# Patient Record
Sex: FEMALE | Race: WHITE | NOT HISPANIC OR LATINO | Employment: PART TIME | URBAN - METROPOLITAN AREA
[De-identification: names, ages, dates, MRNs, and addresses within clinical notes are randomized per-mention and may not be internally consistent; named-entity substitution may affect disease eponyms.]

---

## 2023-05-15 PROBLEM — L70.9 ACNE: Status: ACTIVE | Noted: 2023-05-15

## 2023-05-15 RX ORDER — SPIRONOLACTONE 50 MG/1
1 TABLET, FILM COATED ORAL 2 TIMES DAILY
COMMUNITY
Start: 2020-12-14 | End: 2024-05-21 | Stop reason: SDUPTHER

## 2023-05-15 RX ORDER — NORGESTIMATE AND ETHINYL ESTRADIOL 0.25-0.035
1 KIT ORAL DAILY
COMMUNITY
Start: 2023-01-09 | End: 2023-12-12

## 2023-05-17 ENCOUNTER — OFFICE VISIT (OUTPATIENT)
Dept: PRIMARY CARE | Facility: CLINIC | Age: 23
End: 2023-05-17
Payer: COMMERCIAL

## 2023-05-17 VITALS
HEIGHT: 64 IN | WEIGHT: 191 LBS | HEART RATE: 110 BPM | DIASTOLIC BLOOD PRESSURE: 90 MMHG | BODY MASS INDEX: 32.61 KG/M2 | SYSTOLIC BLOOD PRESSURE: 124 MMHG

## 2023-05-17 DIAGNOSIS — Z76.89 ENCOUNTER TO ESTABLISH CARE: ICD-10-CM

## 2023-05-17 DIAGNOSIS — F41.9 ANXIETY: ICD-10-CM

## 2023-05-17 DIAGNOSIS — Z00.00 HEALTHCARE MAINTENANCE: ICD-10-CM

## 2023-05-17 DIAGNOSIS — L70.0 ACNE VULGARIS: Primary | ICD-10-CM

## 2023-05-17 PROCEDURE — 99385 PREV VISIT NEW AGE 18-39: CPT | Performed by: CLINICAL NURSE SPECIALIST

## 2023-05-17 PROCEDURE — 1036F TOBACCO NON-USER: CPT | Performed by: CLINICAL NURSE SPECIALIST

## 2023-05-17 ASSESSMENT — ENCOUNTER SYMPTOMS
SLEEP DISTURBANCE: 0
HEMATURIA: 0
CHILLS: 0
ABDOMINAL PAIN: 0
EYE PAIN: 0
NECK PAIN: 0
OCCASIONAL FEELINGS OF UNSTEADINESS: 0
CONSTIPATION: 0
LOSS OF SENSATION IN FEET: 0
PHOTOPHOBIA: 0
WHEEZING: 0
UNEXPECTED WEIGHT CHANGE: 0
DYSURIA: 0
FEVER: 0
POLYDIPSIA: 0
WOUND: 0
DIZZINESS: 0
VOMITING: 0
CONFUSION: 0
DIARRHEA: 0
BRUISES/BLEEDS EASILY: 0
FLANK PAIN: 0
MYALGIAS: 0
DEPRESSION: 0
ACTIVITY CHANGE: 0
APPETITE CHANGE: 0
HEADACHES: 0
SEIZURES: 0
ARTHRALGIAS: 0
COUGH: 0
FATIGUE: 0
BLOOD IN STOOL: 0
CHEST TIGHTNESS: 0
PALPITATIONS: 0
JOINT SWELLING: 0
SORE THROAT: 0
NAUSEA: 0
TROUBLE SWALLOWING: 0
SHORTNESS OF BREATH: 0
BACK PAIN: 0

## 2023-05-17 ASSESSMENT — ANXIETY QUESTIONNAIRES
7. FEELING AFRAID AS IF SOMETHING AWFUL MIGHT HAPPEN: SEVERAL DAYS
IF YOU CHECKED OFF ANY PROBLEMS ON THIS QUESTIONNAIRE, HOW DIFFICULT HAVE THESE PROBLEMS MADE IT FOR YOU TO DO YOUR WORK, TAKE CARE OF THINGS AT HOME, OR GET ALONG WITH OTHER PEOPLE: SOMEWHAT DIFFICULT
4. TROUBLE RELAXING: SEVERAL DAYS
2. NOT BEING ABLE TO STOP OR CONTROL WORRYING: MORE THAN HALF THE DAYS
GAD7 TOTAL SCORE: 12
5. BEING SO RESTLESS THAT IT IS HARD TO SIT STILL: NEARLY EVERY DAY
3. WORRYING TOO MUCH ABOUT DIFFERENT THINGS: MORE THAN HALF THE DAYS
6. BECOMING EASILY ANNOYED OR IRRITABLE: SEVERAL DAYS
1. FEELING NERVOUS, ANXIOUS, OR ON EDGE: MORE THAN HALF THE DAYS

## 2023-05-17 ASSESSMENT — COLUMBIA-SUICIDE SEVERITY RATING SCALE - C-SSRS
1. IN THE PAST MONTH, HAVE YOU WISHED YOU WERE DEAD OR WISHED YOU COULD GO TO SLEEP AND NOT WAKE UP?: NO
2. HAVE YOU ACTUALLY HAD ANY THOUGHTS OF KILLING YOURSELF?: NO
6. HAVE YOU EVER DONE ANYTHING, STARTED TO DO ANYTHING, OR PREPARED TO DO ANYTHING TO END YOUR LIFE?: NO

## 2023-05-17 ASSESSMENT — PATIENT HEALTH QUESTIONNAIRE - PHQ9
2. FEELING DOWN, DEPRESSED OR HOPELESS: NOT AT ALL
SUM OF ALL RESPONSES TO PHQ9 QUESTIONS 1 AND 2: 0
1. LITTLE INTEREST OR PLEASURE IN DOING THINGS: NOT AT ALL

## 2023-05-17 NOTE — PROGRESS NOTES
Subjective   Patient ID: Charu Head is a 22 y.o. female who presents for Establish Care (To est new patient Discuss anxiety ).  HPI    New patient here today to establish care. Has been having increased concerns for Anxiety. Patient states that she started taking Ashwaghanda. In April noticed that her Anxiety was worse daily. Took medication for about 2-3 weeks. Thought she was feeling better and stopped medication. Tearful. Increased stressors. Graduating. Unsure what is causing her Anxiety to feel worse. Not interested in Counseling. Feels that she has a support system with her Boyfriend and Mother.     Review of Systems   Constitutional:  Negative for activity change, appetite change, chills, fatigue, fever and unexpected weight change.   HENT:  Negative for ear pain, hearing loss, nosebleeds, sore throat, tinnitus and trouble swallowing.    Eyes:  Negative for photophobia, pain and visual disturbance.   Respiratory:  Negative for cough, chest tightness, shortness of breath and wheezing.    Cardiovascular:  Negative for chest pain, palpitations and leg swelling.   Gastrointestinal:  Negative for abdominal pain, blood in stool, constipation, diarrhea, nausea and vomiting.   Endocrine: Negative for cold intolerance, heat intolerance, polydipsia and polyuria.   Genitourinary:  Negative for dysuria, flank pain and hematuria.   Musculoskeletal:  Negative for arthralgias, back pain, joint swelling, myalgias and neck pain.   Skin:  Negative for pallor, rash and wound.   Allergic/Immunologic: Negative for immunocompromised state.   Neurological:  Negative for dizziness, seizures and headaches.   Hematological:  Does not bruise/bleed easily.   Psychiatric/Behavioral:  Negative for confusion and sleep disturbance.        Objective   Physical Exam  Vitals and nursing note reviewed.   Constitutional:       General: She is not in acute distress.     Appearance: Normal appearance.   HENT:      Head: Normocephalic.       Nose: Nose normal.   Eyes:      Conjunctiva/sclera: Conjunctivae normal.   Neck:      Vascular: No carotid bruit.   Cardiovascular:      Rate and Rhythm: Normal rate and regular rhythm.      Pulses: Normal pulses.      Heart sounds: Normal heart sounds.   Pulmonary:      Effort: Pulmonary effort is normal.      Breath sounds: Normal breath sounds.   Abdominal:      General: Bowel sounds are normal.      Palpations: Abdomen is soft.   Musculoskeletal:         General: Normal range of motion.      Cervical back: Normal range of motion.   Skin:     General: Skin is warm and dry.   Neurological:      Mental Status: She is alert and oriented to person, place, and time. Mental status is at baseline.   Psychiatric:         Mood and Affect: Mood normal.         Behavior: Behavior normal.         Assessment/Plan       New order for blood work provided at OV today.      Anxiety: GAD7 completed. Discussed management options. Declined Counseling. Would like to continue to take Ashwagandha. Follow up in 1 month.   Acne: Continue Spironolactone. Updated lab work ordered.   Wellness: Routine and age appropriate recommendations discussed with the patient today and patient verbalized understanding of the recommendations.  Questions answered.  Age appropriate immunizations and preventative screenings discussed with the patient and ordered as appropriate. Labs updated and ordered as indicated. Recommend healthy diet and daily exercise to maintain healthy body weight.

## 2023-05-17 NOTE — LETTER
May 17, 2023     Patient: Charu Head   YOB: 2000   Date of Visit: 5/17/2023       To Whom It May Concern:    Charu Head was seen in my clinic on 5/17/2023 at 2:40 pm. Please excuse Emili from work 5/17/2023    If you have any questions or concerns, please don't hesitate to call.         Sincerely,         Kiara Mendenhall, APRN-CNS

## 2023-05-20 ENCOUNTER — LAB (OUTPATIENT)
Dept: LAB | Facility: LAB | Age: 23
End: 2023-05-20
Payer: COMMERCIAL

## 2023-05-20 DIAGNOSIS — Z00.00 HEALTHCARE MAINTENANCE: ICD-10-CM

## 2023-05-20 DIAGNOSIS — L70.0 ACNE VULGARIS: ICD-10-CM

## 2023-05-20 DIAGNOSIS — F41.9 ANXIETY: ICD-10-CM

## 2023-05-20 LAB
ALANINE AMINOTRANSFERASE (SGPT) (U/L) IN SER/PLAS: 16 U/L (ref 7–45)
ALBUMIN (G/DL) IN SER/PLAS: 4.2 G/DL (ref 3.4–5)
ALKALINE PHOSPHATASE (U/L) IN SER/PLAS: 72 U/L (ref 33–110)
ANION GAP IN SER/PLAS: 12 MMOL/L (ref 10–20)
ASPARTATE AMINOTRANSFERASE (SGOT) (U/L) IN SER/PLAS: 13 U/L (ref 9–39)
BILIRUBIN TOTAL (MG/DL) IN SER/PLAS: 0.7 MG/DL (ref 0–1.2)
CALCIDIOL (25 OH VITAMIN D3) (NG/ML) IN SER/PLAS: 47 NG/ML
CALCIUM (MG/DL) IN SER/PLAS: 9 MG/DL (ref 8.6–10.3)
CARBON DIOXIDE, TOTAL (MMOL/L) IN SER/PLAS: 27 MMOL/L (ref 21–32)
CHLORIDE (MMOL/L) IN SER/PLAS: 103 MMOL/L (ref 98–107)
CHOLESTEROL (MG/DL) IN SER/PLAS: 201 MG/DL (ref 0–199)
CHOLESTEROL IN HDL (MG/DL) IN SER/PLAS: 44.7 MG/DL
CHOLESTEROL/HDL RATIO: 4.5
COBALAMIN (VITAMIN B12) (PG/ML) IN SER/PLAS: 195 PG/ML (ref 211–911)
CREATININE (MG/DL) IN SER/PLAS: 0.75 MG/DL (ref 0.5–1.05)
ERYTHROCYTE DISTRIBUTION WIDTH (RATIO) BY AUTOMATED COUNT: 11.8 % (ref 11.5–14.5)
ERYTHROCYTE MEAN CORPUSCULAR HEMOGLOBIN CONCENTRATION (G/DL) BY AUTOMATED: 33.7 G/DL (ref 32–36)
ERYTHROCYTE MEAN CORPUSCULAR VOLUME (FL) BY AUTOMATED COUNT: 87 FL (ref 80–100)
ERYTHROCYTES (10*6/UL) IN BLOOD BY AUTOMATED COUNT: 4.81 X10E12/L (ref 4–5.2)
GFR FEMALE: >90 ML/MIN/1.73M2
GLUCOSE (MG/DL) IN SER/PLAS: 79 MG/DL (ref 74–99)
HEMATOCRIT (%) IN BLOOD BY AUTOMATED COUNT: 41.8 % (ref 36–46)
HEMOGLOBIN (G/DL) IN BLOOD: 14.1 G/DL (ref 12–16)
LDL: 122 MG/DL (ref 0–119)
LEUKOCYTES (10*3/UL) IN BLOOD BY AUTOMATED COUNT: 7.8 X10E9/L (ref 4.4–11.3)
NON HDL CHOLESTEROL: 156 MG/DL (ref 0–149)
PLATELETS (10*3/UL) IN BLOOD AUTOMATED COUNT: 349 X10E9/L (ref 150–450)
POTASSIUM (MMOL/L) IN SER/PLAS: 4.1 MMOL/L (ref 3.5–5.3)
PROTEIN TOTAL: 6.8 G/DL (ref 6.4–8.2)
SODIUM (MMOL/L) IN SER/PLAS: 138 MMOL/L (ref 136–145)
THYROTROPIN (MIU/L) IN SER/PLAS BY DETECTION LIMIT <= 0.05 MIU/L: 2.08 MIU/L (ref 0.44–3.98)
TRIGLYCERIDE (MG/DL) IN SER/PLAS: 170 MG/DL (ref 0–149)
UREA NITROGEN (MG/DL) IN SER/PLAS: 12 MG/DL (ref 6–23)
VLDL: 34 MG/DL (ref 0–40)

## 2023-05-20 PROCEDURE — 80061 LIPID PANEL: CPT

## 2023-05-20 PROCEDURE — 85027 COMPLETE CBC AUTOMATED: CPT

## 2023-05-20 PROCEDURE — 80053 COMPREHEN METABOLIC PANEL: CPT

## 2023-05-20 PROCEDURE — 84443 ASSAY THYROID STIM HORMONE: CPT

## 2023-05-20 PROCEDURE — 82306 VITAMIN D 25 HYDROXY: CPT

## 2023-05-20 PROCEDURE — 36415 COLL VENOUS BLD VENIPUNCTURE: CPT

## 2023-05-20 PROCEDURE — 82607 VITAMIN B-12: CPT

## 2023-05-22 ENCOUNTER — TELEPHONE (OUTPATIENT)
Dept: PRIMARY CARE | Facility: CLINIC | Age: 23
End: 2023-05-22
Payer: COMMERCIAL

## 2023-05-22 NOTE — TELEPHONE ENCOUNTER
----- Message from RACHEL Barrientos-CNS sent at 5/21/2023 10:14 PM EDT -----  Please let patient know that her Vitamin B12 is low. Start Vitamin B12. Cholesterol is borderline. Encourage healthy dietary changes and increased physical activity. Keep follow up appointment and we can discuss further at that time. Thank you!

## 2023-06-26 ENCOUNTER — OFFICE VISIT (OUTPATIENT)
Dept: PRIMARY CARE | Facility: CLINIC | Age: 23
End: 2023-06-26
Payer: COMMERCIAL

## 2023-06-26 VITALS
DIASTOLIC BLOOD PRESSURE: 80 MMHG | HEART RATE: 80 BPM | BODY MASS INDEX: 32.1 KG/M2 | WEIGHT: 188 LBS | HEIGHT: 64 IN | SYSTOLIC BLOOD PRESSURE: 110 MMHG

## 2023-06-26 DIAGNOSIS — Z00.00 HEALTHCARE MAINTENANCE: ICD-10-CM

## 2023-06-26 DIAGNOSIS — L70.0 ACNE VULGARIS: ICD-10-CM

## 2023-06-26 DIAGNOSIS — F41.9 ANXIETY: ICD-10-CM

## 2023-06-26 DIAGNOSIS — E53.8 VITAMIN B12 DEFICIENCY: Primary | ICD-10-CM

## 2023-06-26 DIAGNOSIS — E78.2 MIXED HYPERLIPIDEMIA: ICD-10-CM

## 2023-06-26 PROBLEM — E78.5 HYPERLIPIDEMIA: Status: ACTIVE | Noted: 2023-06-26

## 2023-06-26 PROCEDURE — 99213 OFFICE O/P EST LOW 20 MIN: CPT | Performed by: CLINICAL NURSE SPECIALIST

## 2023-06-26 PROCEDURE — 1036F TOBACCO NON-USER: CPT | Performed by: CLINICAL NURSE SPECIALIST

## 2023-06-26 RX ORDER — CYANOCOBALAMIN (VITAMIN B-12) 250 MCG
250 TABLET ORAL DAILY
COMMUNITY

## 2023-06-26 RX ORDER — ESCITALOPRAM OXALATE 20 MG/1
20 TABLET ORAL DAILY
Qty: 30 TABLET | Refills: 5 | Status: SHIPPED | OUTPATIENT
Start: 2023-06-26 | End: 2023-08-02 | Stop reason: SDUPTHER

## 2023-06-26 ASSESSMENT — ENCOUNTER SYMPTOMS
POLYDIPSIA: 0
DEPRESSION: 0
BLOOD IN STOOL: 0
MYALGIAS: 0
SORE THROAT: 0
UNEXPECTED WEIGHT CHANGE: 0
EYE PAIN: 0
NAUSEA: 0
OCCASIONAL FEELINGS OF UNSTEADINESS: 0
PHOTOPHOBIA: 0
WHEEZING: 0
BACK PAIN: 0
VOMITING: 0
DIZZINESS: 0
SHORTNESS OF BREATH: 0
CONSTIPATION: 0
ARTHRALGIAS: 0
DIARRHEA: 0
APPETITE CHANGE: 0
LOSS OF SENSATION IN FEET: 0
SEIZURES: 0
WOUND: 0
CONFUSION: 0
CHILLS: 0
FATIGUE: 0
DYSURIA: 0
TROUBLE SWALLOWING: 0
JOINT SWELLING: 0
NECK PAIN: 0
HEMATURIA: 0
HEADACHES: 0
CHEST TIGHTNESS: 0
FEVER: 0
FLANK PAIN: 0
BRUISES/BLEEDS EASILY: 0
NERVOUS/ANXIOUS: 1
ACTIVITY CHANGE: 0
PALPITATIONS: 0
COUGH: 0
ABDOMINAL PAIN: 0
SLEEP DISTURBANCE: 0

## 2023-06-26 ASSESSMENT — COLUMBIA-SUICIDE SEVERITY RATING SCALE - C-SSRS
6. HAVE YOU EVER DONE ANYTHING, STARTED TO DO ANYTHING, OR PREPARED TO DO ANYTHING TO END YOUR LIFE?: NO
1. IN THE PAST MONTH, HAVE YOU WISHED YOU WERE DEAD OR WISHED YOU COULD GO TO SLEEP AND NOT WAKE UP?: NO
2. HAVE YOU ACTUALLY HAD ANY THOUGHTS OF KILLING YOURSELF?: NO

## 2023-06-26 ASSESSMENT — ANXIETY QUESTIONNAIRES
5. BEING SO RESTLESS THAT IT IS HARD TO SIT STILL: NEARLY EVERY DAY
1. FEELING NERVOUS, ANXIOUS, OR ON EDGE: MORE THAN HALF THE DAYS
4. TROUBLE RELAXING: MORE THAN HALF THE DAYS
2. NOT BEING ABLE TO STOP OR CONTROL WORRYING: MORE THAN HALF THE DAYS
3. WORRYING TOO MUCH ABOUT DIFFERENT THINGS: MORE THAN HALF THE DAYS
GAD7 TOTAL SCORE: 15
7. FEELING AFRAID AS IF SOMETHING AWFUL MIGHT HAPPEN: MORE THAN HALF THE DAYS
6. BECOMING EASILY ANNOYED OR IRRITABLE: MORE THAN HALF THE DAYS
IF YOU CHECKED OFF ANY PROBLEMS ON THIS QUESTIONNAIRE, HOW DIFFICULT HAVE THESE PROBLEMS MADE IT FOR YOU TO DO YOUR WORK, TAKE CARE OF THINGS AT HOME, OR GET ALONG WITH OTHER PEOPLE: SOMEWHAT DIFFICULT

## 2023-06-26 ASSESSMENT — PATIENT HEALTH QUESTIONNAIRE - PHQ9
SUM OF ALL RESPONSES TO PHQ9 QUESTIONS 1 AND 2: 0
1. LITTLE INTEREST OR PLEASURE IN DOING THINGS: NOT AT ALL
2. FEELING DOWN, DEPRESSED OR HOPELESS: NOT AT ALL

## 2023-06-26 NOTE — PROGRESS NOTES
Subjective   Patient ID: Charu Head is a 23 y.o. female who presents for Follow-up (Follow up anxiety ).  HPI    Here today as a follow up appointment for her Anxiety.  Has been having increased concerns for Anxiety. Patient states that she started taking Ashwaghanda. In April noticed that her Anxiety was worse daily. Took medication for about 2-3 weeks. Thought she was feeling better and stopped medication. Tearful. Increased stressors. Graduating. Unsure what is causing her Anxiety to feel worse. Not interested in Counseling. Feels that she has a support system with her Boyfriend and Mother.     Tried to increase her Ashwaghanda. Still feels like the medication is wearing off for her. Interested in prescription medication.     Review of Systems   Constitutional:  Negative for activity change, appetite change, chills, fatigue, fever and unexpected weight change.   HENT:  Negative for ear pain, hearing loss, nosebleeds, sore throat, tinnitus and trouble swallowing.    Eyes:  Negative for photophobia, pain and visual disturbance.   Respiratory:  Negative for cough, chest tightness, shortness of breath and wheezing.    Cardiovascular:  Negative for chest pain, palpitations and leg swelling.   Gastrointestinal:  Negative for abdominal pain, blood in stool, constipation, diarrhea, nausea and vomiting.   Endocrine: Negative for cold intolerance, heat intolerance, polydipsia and polyuria.   Genitourinary:  Negative for dysuria, flank pain and hematuria.   Musculoskeletal:  Negative for arthralgias, back pain, joint swelling, myalgias and neck pain.   Skin:  Negative for pallor, rash and wound.   Allergic/Immunologic: Negative for immunocompromised state.   Neurological:  Negative for dizziness, seizures and headaches.   Hematological:  Does not bruise/bleed easily.   Psychiatric/Behavioral:  Negative for confusion and sleep disturbance. The patient is nervous/anxious.        Objective   Physical Exam  Vitals and  nursing note reviewed.   Constitutional:       General: She is not in acute distress.     Appearance: Normal appearance.   HENT:      Head: Normocephalic.      Nose: Nose normal.   Eyes:      Conjunctiva/sclera: Conjunctivae normal.   Neck:      Vascular: No carotid bruit.   Cardiovascular:      Rate and Rhythm: Normal rate and regular rhythm.      Pulses: Normal pulses.      Heart sounds: Normal heart sounds.   Pulmonary:      Effort: Pulmonary effort is normal.      Breath sounds: Normal breath sounds.   Abdominal:      General: Bowel sounds are normal.      Palpations: Abdomen is soft.   Musculoskeletal:         General: Normal range of motion.      Cervical back: Normal range of motion.   Skin:     General: Skin is warm and dry.   Neurological:      Mental Status: She is alert and oriented to person, place, and time. Mental status is at baseline.   Psychiatric:         Mood and Affect: Mood normal.         Behavior: Behavior normal.       Assessment/Plan       Reviewed results of blood work completed with patient.       Anxiety: GAD7 completed. Discussed management options. Declined Counseling. Lexapro. Follow up in 1 month.   Acne: Continue Spironolactone.   Vitamin B12 Deficiency: Vitamin B12. Reevaluate with next lab work.   Obesity: Hyperlipidemia: BMI: 32.27.  Lifestyle changes recommended: Diet consisting of low fat foods, lean meats, high fiber, fresh fruits and vegetables. 150 min/ weekly aerobic exercise.

## 2023-06-26 NOTE — LETTER
June 26, 2023     Patient: Charu Head   YOB: 2000   Date of Visit: 6/26/2023       To Whom It May Concern:    Charu Head was seen in my clinic on 6/26/2023. Please excuse Emili absence from work.  If you have any questions or concerns, please don't hesitate to call.         Sincerely,         Kiara Mendenhall, APRN-CNS

## 2023-08-02 ENCOUNTER — OFFICE VISIT (OUTPATIENT)
Dept: PRIMARY CARE | Facility: CLINIC | Age: 23
End: 2023-08-02
Payer: COMMERCIAL

## 2023-08-02 VITALS
SYSTOLIC BLOOD PRESSURE: 120 MMHG | HEART RATE: 68 BPM | BODY MASS INDEX: 31.76 KG/M2 | WEIGHT: 186 LBS | HEIGHT: 64 IN | DIASTOLIC BLOOD PRESSURE: 70 MMHG

## 2023-08-02 DIAGNOSIS — F41.9 ANXIETY: ICD-10-CM

## 2023-08-02 DIAGNOSIS — E78.2 MIXED HYPERLIPIDEMIA: ICD-10-CM

## 2023-08-02 DIAGNOSIS — E53.8 VITAMIN B12 DEFICIENCY: Primary | ICD-10-CM

## 2023-08-02 PROCEDURE — 99213 OFFICE O/P EST LOW 20 MIN: CPT | Performed by: CLINICAL NURSE SPECIALIST

## 2023-08-02 PROCEDURE — 1036F TOBACCO NON-USER: CPT | Performed by: CLINICAL NURSE SPECIALIST

## 2023-08-02 RX ORDER — ESCITALOPRAM OXALATE 20 MG/1
20 TABLET ORAL DAILY
Qty: 30 TABLET | Refills: 11 | Status: SHIPPED | OUTPATIENT
Start: 2023-08-02 | End: 2024-02-02 | Stop reason: SDUPTHER

## 2023-08-02 ASSESSMENT — ENCOUNTER SYMPTOMS
DIZZINESS: 0
FLANK PAIN: 0
DIARRHEA: 0
CHILLS: 0
COUGH: 0
FEVER: 0
CHEST TIGHTNESS: 0
LOSS OF SENSATION IN FEET: 0
ACTIVITY CHANGE: 0
APPETITE CHANGE: 0
MYALGIAS: 0
HEMATURIA: 0
BRUISES/BLEEDS EASILY: 0
OCCASIONAL FEELINGS OF UNSTEADINESS: 0
CONSTIPATION: 0
WHEEZING: 0
HEADACHES: 0
DEPRESSION: 0
PHOTOPHOBIA: 0
SEIZURES: 0
POLYDIPSIA: 0
TROUBLE SWALLOWING: 0
NECK PAIN: 0
ARTHRALGIAS: 0
NAUSEA: 0
CONFUSION: 0
WOUND: 0
UNEXPECTED WEIGHT CHANGE: 0
SLEEP DISTURBANCE: 0
JOINT SWELLING: 0
ABDOMINAL PAIN: 0
EYE PAIN: 0
PALPITATIONS: 0
SORE THROAT: 0
VOMITING: 0
BACK PAIN: 0
FATIGUE: 0
BLOOD IN STOOL: 0
DYSURIA: 0
SHORTNESS OF BREATH: 0

## 2023-08-02 ASSESSMENT — ANXIETY QUESTIONNAIRES
3. WORRYING TOO MUCH ABOUT DIFFERENT THINGS: NOT AT ALL
2. NOT BEING ABLE TO STOP OR CONTROL WORRYING: NOT AT ALL
5. BEING SO RESTLESS THAT IT IS HARD TO SIT STILL: SEVERAL DAYS
1. FEELING NERVOUS, ANXIOUS, OR ON EDGE: SEVERAL DAYS
6. BECOMING EASILY ANNOYED OR IRRITABLE: NOT AT ALL
4. TROUBLE RELAXING: MORE THAN HALF THE DAYS
7. FEELING AFRAID AS IF SOMETHING AWFUL MIGHT HAPPEN: SEVERAL DAYS
GAD7 TOTAL SCORE: 5

## 2023-08-02 ASSESSMENT — PATIENT HEALTH QUESTIONNAIRE - PHQ9
8. MOVING OR SPEAKING SO SLOWLY THAT OTHER PEOPLE COULD HAVE NOTICED. OR THE OPPOSITE, BEING SO FIGETY OR RESTLESS THAT YOU HAVE BEEN MOVING AROUND A LOT MORE THAN USUAL: NOT AT ALL
7. TROUBLE CONCENTRATING ON THINGS, SUCH AS READING THE NEWSPAPER OR WATCHING TELEVISION: SEVERAL DAYS
4. FEELING TIRED OR HAVING LITTLE ENERGY: SEVERAL DAYS
1. LITTLE INTEREST OR PLEASURE IN DOING THINGS: NOT AT ALL
5. POOR APPETITE OR OVEREATING: NOT AT ALL
6. FEELING BAD ABOUT YOURSELF - OR THAT YOU ARE A FAILURE OR HAVE LET YOURSELF OR YOUR FAMILY DOWN: NOT AT ALL
1. LITTLE INTEREST OR PLEASURE IN DOING THINGS: NOT AT ALL
SUM OF ALL RESPONSES TO PHQ9 QUESTIONS 1 AND 2: 0
2. FEELING DOWN, DEPRESSED OR HOPELESS: NOT AT ALL
9. THOUGHTS THAT YOU WOULD BE BETTER OFF DEAD, OR OF HURTING YOURSELF: NOT AT ALL
3. TROUBLE FALLING OR STAYING ASLEEP OR SLEEPING TOO MUCH: NOT AT ALL
2. FEELING DOWN, DEPRESSED OR HOPELESS: NOT AT ALL

## 2023-08-02 ASSESSMENT — COLUMBIA-SUICIDE SEVERITY RATING SCALE - C-SSRS
6. HAVE YOU EVER DONE ANYTHING, STARTED TO DO ANYTHING, OR PREPARED TO DO ANYTHING TO END YOUR LIFE?: NO
2. HAVE YOU ACTUALLY HAD ANY THOUGHTS OF KILLING YOURSELF?: NO
1. IN THE PAST MONTH, HAVE YOU WISHED YOU WERE DEAD OR WISHED YOU COULD GO TO SLEEP AND NOT WAKE UP?: NO

## 2023-08-02 NOTE — PROGRESS NOTES
"Subjective   Patient ID: Charu Head is a 23 y.o. female who presents for Follow-up (Follow up).  HPI    Here today as a follow up appointment for her Anxiety.  Has been having increased concerns for Anxiety. Patient states that she started taking Ashwaghanda. In April noticed that her Anxiety was worse daily. Took medication for about 2-3 weeks. Thought she was feeling better and stopped medication. Tearful. Increased stressors. Graduating. Unsure what is causing her Anxiety to feel worse. Not interested in Counseling. Feels that she has a support system with her Boyfriend and Mother.      Tried to increase her Ashwaghanda. Still feels like the medication is wearing off for her. Interested in prescription medication.     Starting taking Lexapro. Has been doing well with medication. Increased Fatigue initially, improving. Was taking multiple naps a day, now no longer. Patient has noticed increased fidgeting. Had before medication, worsened after starting the medication. Denies any other side effects. Feels like \"a whole new person.\" Would like to continue current medication.     Review of Systems   Constitutional:  Negative for activity change, appetite change, chills, fatigue, fever and unexpected weight change.   HENT:  Negative for ear pain, hearing loss, nosebleeds, sore throat, tinnitus and trouble swallowing.    Eyes:  Negative for photophobia, pain and visual disturbance.   Respiratory:  Negative for cough, chest tightness, shortness of breath and wheezing.    Cardiovascular:  Negative for chest pain, palpitations and leg swelling.   Gastrointestinal:  Negative for abdominal pain, blood in stool, constipation, diarrhea, nausea and vomiting.   Endocrine: Negative for cold intolerance, heat intolerance, polydipsia and polyuria.   Genitourinary:  Negative for dysuria, flank pain and hematuria.   Musculoskeletal:  Negative for arthralgias, back pain, joint swelling, myalgias and neck pain.   Skin:  Negative " for pallor, rash and wound.   Allergic/Immunologic: Negative for immunocompromised state.   Neurological:  Negative for dizziness, seizures and headaches.   Hematological:  Does not bruise/bleed easily.   Psychiatric/Behavioral:  Negative for confusion and sleep disturbance.        Objective   Physical Exam  Vitals and nursing note reviewed.   Constitutional:       General: She is not in acute distress.     Appearance: Normal appearance.   HENT:      Head: Normocephalic.      Nose: Nose normal.   Eyes:      Conjunctiva/sclera: Conjunctivae normal.   Neck:      Vascular: No carotid bruit.   Cardiovascular:      Rate and Rhythm: Normal rate and regular rhythm.      Pulses: Normal pulses.      Heart sounds: Normal heart sounds.   Pulmonary:      Effort: Pulmonary effort is normal.      Breath sounds: Normal breath sounds.   Abdominal:      General: Bowel sounds are normal.      Palpations: Abdomen is soft.   Musculoskeletal:         General: Normal range of motion.      Cervical back: Normal range of motion.   Skin:     General: Skin is warm and dry.   Neurological:      Mental Status: She is alert and oriented to person, place, and time. Mental status is at baseline.   Psychiatric:         Mood and Affect: Mood normal.         Behavior: Behavior normal.       Assessment/Plan        Reviewed results of blood work completed with patient at last OV.       Anxiety: GAD7 completed. Improved. Declined Counseling. Continue current dosage of Lexapro.    Acne: Continue Spironolactone.   Vitamin B12 Deficiency: Vitamin B12. Reevaluate with next lab work.   Obesity: Hyperlipidemia: BMI: 31.97.  Lifestyle changes recommended: Diet consisting of low fat foods, lean meats, high fiber, fresh fruits and vegetables. 150 min/ weekly aerobic exercise.

## 2023-08-02 NOTE — LETTER
August 2, 2023     Patient: Charu Head   YOB: 2000   Date of Visit: 8/2/2023       To Whom It May Concern:    Charu Head was seen in my clinic on 8/2/2023. Please excuse mother Emili from work as she brought patient to appointment.   If you have any questions or concerns, please don't hesitate to call.         Sincerely,         Kiara Mendenhall, RACHEL-CNS

## 2023-12-11 DIAGNOSIS — Z30.41 ENCOUNTER FOR SURVEILLANCE OF CONTRACEPTIVE PILLS: Primary | ICD-10-CM

## 2023-12-12 RX ORDER — NORGESTIMATE AND ETHINYL ESTRADIOL 0.25-0.035
1 KIT ORAL DAILY
Qty: 84 TABLET | Refills: 0 | Status: SHIPPED | OUTPATIENT
Start: 2023-12-12 | End: 2024-01-23 | Stop reason: SDUPTHER

## 2024-01-23 DIAGNOSIS — Z30.41 ENCOUNTER FOR SURVEILLANCE OF CONTRACEPTIVE PILLS: ICD-10-CM

## 2024-01-23 RX ORDER — NORGESTIMATE AND ETHINYL ESTRADIOL 0.25-0.035
1 KIT ORAL DAILY
Qty: 84 TABLET | Refills: 0 | Status: SHIPPED | OUTPATIENT
Start: 2024-01-23 | End: 2024-05-21 | Stop reason: SDUPTHER

## 2024-01-23 NOTE — TELEPHONE ENCOUNTER
Patient is scheduled 5/21/24 for her annual. She will run out of [employer] in March. Will you refill to get her through. Pharmacy is up to date. Thank  you

## 2024-02-02 ENCOUNTER — LAB (OUTPATIENT)
Dept: LAB | Facility: LAB | Age: 24
End: 2024-02-02
Payer: COMMERCIAL

## 2024-02-02 ENCOUNTER — TELEPHONE (OUTPATIENT)
Dept: PRIMARY CARE | Facility: CLINIC | Age: 24
End: 2024-02-02

## 2024-02-02 ENCOUNTER — OFFICE VISIT (OUTPATIENT)
Dept: PRIMARY CARE | Facility: CLINIC | Age: 24
End: 2024-02-02
Payer: COMMERCIAL

## 2024-02-02 VITALS
SYSTOLIC BLOOD PRESSURE: 130 MMHG | WEIGHT: 195 LBS | BODY MASS INDEX: 33.29 KG/M2 | HEART RATE: 107 BPM | HEIGHT: 64 IN | DIASTOLIC BLOOD PRESSURE: 70 MMHG

## 2024-02-02 DIAGNOSIS — E53.8 VITAMIN B12 DEFICIENCY: ICD-10-CM

## 2024-02-02 DIAGNOSIS — F41.9 ANXIETY: ICD-10-CM

## 2024-02-02 DIAGNOSIS — E78.2 MIXED HYPERLIPIDEMIA: ICD-10-CM

## 2024-02-02 LAB
ALBUMIN SERPL BCP-MCNC: 4 G/DL (ref 3.4–5)
ALP SERPL-CCNC: 65 U/L (ref 33–110)
ALT SERPL W P-5'-P-CCNC: 12 U/L (ref 7–45)
ANION GAP SERPL CALC-SCNC: 11 MMOL/L (ref 10–20)
AST SERPL W P-5'-P-CCNC: 12 U/L (ref 9–39)
BILIRUB SERPL-MCNC: 0.6 MG/DL (ref 0–1.2)
BUN SERPL-MCNC: 12 MG/DL (ref 6–23)
CALCIUM SERPL-MCNC: 8.7 MG/DL (ref 8.6–10.3)
CHLORIDE SERPL-SCNC: 102 MMOL/L (ref 98–107)
CHOLEST SERPL-MCNC: 179 MG/DL (ref 0–199)
CHOLESTEROL/HDL RATIO: 4.1
CO2 SERPL-SCNC: 27 MMOL/L (ref 21–32)
CREAT SERPL-MCNC: 0.79 MG/DL (ref 0.5–1.05)
EGFRCR SERPLBLD CKD-EPI 2021: >90 ML/MIN/1.73M*2
GLUCOSE SERPL-MCNC: 75 MG/DL (ref 74–99)
HDLC SERPL-MCNC: 44.1 MG/DL
LDLC SERPL CALC-MCNC: 100 MG/DL
NON HDL CHOLESTEROL: 135 MG/DL (ref 0–149)
POTASSIUM SERPL-SCNC: 4.1 MMOL/L (ref 3.5–5.3)
PROT SERPL-MCNC: 6.8 G/DL (ref 6.4–8.2)
SODIUM SERPL-SCNC: 136 MMOL/L (ref 136–145)
TRIGL SERPL-MCNC: 175 MG/DL (ref 0–149)
VIT B12 SERPL-MCNC: 968 PG/ML (ref 211–911)
VLDL: 35 MG/DL (ref 0–40)

## 2024-02-02 PROCEDURE — 80053 COMPREHEN METABOLIC PANEL: CPT

## 2024-02-02 PROCEDURE — 1036F TOBACCO NON-USER: CPT | Performed by: CLINICAL NURSE SPECIALIST

## 2024-02-02 PROCEDURE — 80061 LIPID PANEL: CPT

## 2024-02-02 PROCEDURE — 36415 COLL VENOUS BLD VENIPUNCTURE: CPT

## 2024-02-02 PROCEDURE — 99214 OFFICE O/P EST MOD 30 MIN: CPT | Performed by: CLINICAL NURSE SPECIALIST

## 2024-02-02 PROCEDURE — 82607 VITAMIN B-12: CPT

## 2024-02-02 RX ORDER — ESCITALOPRAM OXALATE 20 MG/1
20 TABLET ORAL DAILY
Qty: 90 TABLET | Refills: 3 | Status: SHIPPED | OUTPATIENT
Start: 2024-02-02 | End: 2025-01-27

## 2024-02-02 RX ORDER — ESCITALOPRAM OXALATE 20 MG/1
20 TABLET ORAL DAILY
Qty: 90 TABLET | Refills: 3 | Status: SHIPPED | OUTPATIENT
Start: 2024-02-02 | End: 2024-02-02 | Stop reason: SDUPTHER

## 2024-02-02 ASSESSMENT — ENCOUNTER SYMPTOMS
CHILLS: 0
DEPRESSION: 0
JOINT SWELLING: 0
SHORTNESS OF BREATH: 0
PHOTOPHOBIA: 0
BACK PAIN: 0
CHEST TIGHTNESS: 0
SLEEP DISTURBANCE: 0
FATIGUE: 0
SORE THROAT: 0
NECK PAIN: 0
FEVER: 0
WHEEZING: 0
CONFUSION: 0
DIZZINESS: 0
DYSURIA: 0
POLYDIPSIA: 0
VOMITING: 0
FLANK PAIN: 0
NAUSEA: 0
LOSS OF SENSATION IN FEET: 0
SEIZURES: 0
TROUBLE SWALLOWING: 0
EYE PAIN: 0
ARTHRALGIAS: 0
BRUISES/BLEEDS EASILY: 0
OCCASIONAL FEELINGS OF UNSTEADINESS: 0
DIARRHEA: 0
COUGH: 0
HEADACHES: 0
ABDOMINAL PAIN: 0
UNEXPECTED WEIGHT CHANGE: 0
APPETITE CHANGE: 0
HEMATURIA: 0
MYALGIAS: 0
BLOOD IN STOOL: 0
PALPITATIONS: 0
WOUND: 0
CONSTIPATION: 0
ACTIVITY CHANGE: 0

## 2024-02-02 ASSESSMENT — COLUMBIA-SUICIDE SEVERITY RATING SCALE - C-SSRS
2. HAVE YOU ACTUALLY HAD ANY THOUGHTS OF KILLING YOURSELF?: NO
1. IN THE PAST MONTH, HAVE YOU WISHED YOU WERE DEAD OR WISHED YOU COULD GO TO SLEEP AND NOT WAKE UP?: NO
6. HAVE YOU EVER DONE ANYTHING, STARTED TO DO ANYTHING, OR PREPARED TO DO ANYTHING TO END YOUR LIFE?: NO

## 2024-02-02 ASSESSMENT — PATIENT HEALTH QUESTIONNAIRE - PHQ9
2. FEELING DOWN, DEPRESSED OR HOPELESS: NOT AT ALL
1. LITTLE INTEREST OR PLEASURE IN DOING THINGS: NOT AT ALL
SUM OF ALL RESPONSES TO PHQ9 QUESTIONS 1 AND 2: 0

## 2024-02-02 NOTE — PROGRESS NOTES
"Subjective   Patient ID: Charu Head is a 23 y.o. female who presents for Follow-up (Follow up).  HPI    Here today as a follow up appointment for her Anxiety.  Had been having increased concerns for Anxiety. Patient states that she started taking Ashwaghanda. In April noticed that her Anxiety was worse daily. Took medication for about 2-3 weeks. Thought she was feeling better and stopped medication. Tearful. Increased stressors. Graduated. Unsure what is causing her Anxiety to feel worse. Not interested in Counseling. Feels that she has a support system with her Fiance and Mother.      Tried to increase her Ashwaghanda. Still feels like the medication is wearing off for her. Interested in prescription medication.      Starting taking Lexapro. Has been doing well with medication. Increased Fatigue initially, improving. Was taking multiple naps a day, now no longer. Patient had noticed increased fidgeting. Had before medication, worsened after starting the medication. Denies any other side effects. Feels like \"a whole new person.\" Would like to continue current medication.     Has been able to go Shopping and to the Movies with friends now. Planning to go to a [employer] this weekend with her Fiance and friends. 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     Review of Systems   Constitutional:  Negative for activity change, appetite change, chills, fatigue, fever and unexpected weight change.   HENT:  Negative for ear pain, hearing loss, nosebleeds, sore throat, tinnitus and trouble swallowing.    Eyes:  Negative for photophobia, pain and visual disturbance.   Respiratory:  Negative for cough, chest tightness, shortness of breath and wheezing.    Cardiovascular:  Negative for chest pain, palpitations and leg swelling.   Gastrointestinal:  Negative for abdominal pain, blood in stool, constipation, diarrhea, nausea and vomiting.   Endocrine: Negative for cold intolerance, heat intolerance, polydipsia and polyuria. "   Genitourinary:  Negative for dysuria, flank pain and hematuria.   Musculoskeletal:  Negative for arthralgias, back pain, joint swelling, myalgias and neck pain.   Skin:  Negative for pallor, rash and wound.   Allergic/Immunologic: Negative for immunocompromised state.   Neurological:  Negative for dizziness, seizures and headaches.   Hematological:  Does not bruise/bleed easily.   Psychiatric/Behavioral:  Negative for confusion and sleep disturbance.        Objective   Physical Exam  Vitals and nursing note reviewed.   Constitutional:       General: She is not in acute distress.     Appearance: Normal appearance.   HENT:      Head: Normocephalic.      Nose: Nose normal.   Eyes:      Conjunctiva/sclera: Conjunctivae normal.   Neck:      Vascular: No carotid bruit.   Cardiovascular:      Rate and Rhythm: Normal rate and regular rhythm.      Pulses: Normal pulses.      Heart sounds: Normal heart sounds.   Pulmonary:      Effort: Pulmonary effort is normal.      Breath sounds: Normal breath sounds.   Abdominal:      General: Bowel sounds are normal.      Palpations: Abdomen is soft.   Musculoskeletal:         General: Normal range of motion.      Cervical back: Normal range of motion.   Skin:     General: Skin is warm and dry.   Neurological:      Mental Status: She is alert and oriented to person, place, and time. Mental status is at baseline.   Psychiatric:         Mood and Affect: Mood normal.         Behavior: Behavior normal.       Assessment/Plan        Reviewed results of blood work completed with patient, waiting on last lab results.       Anxiety: Continue current dosage of Lexapro.  Declines needing Counseling at this time.   Acne: Continue Spironolactone.   Vitamin B12 Deficiency: Vitamin B12. Reevaluate with next lab work.   Obesity: Hyperlipidemia: BMI: 33.47.  Lifestyle changes recommended: Diet consisting of low fat foods, lean meats, high fiber, fresh fruits and vegetables. 150 min/ weekly aerobic  exercise. Started Strength Training in January.     RACHEL Barrientos-CNS 02/02/24 10:28 AM

## 2024-02-02 NOTE — TELEPHONE ENCOUNTER
----- Message from RACHEL Barrientos-CNS sent at 2/2/2024 12:16 PM EST -----  Please let patient know that B12 has improved. Can decrease supplement, but would continue a daily. Thank you!

## 2024-05-21 ENCOUNTER — OFFICE VISIT (OUTPATIENT)
Dept: OBSTETRICS AND GYNECOLOGY | Facility: CLINIC | Age: 24
End: 2024-05-21
Payer: COMMERCIAL

## 2024-05-21 VITALS
WEIGHT: 194 LBS | SYSTOLIC BLOOD PRESSURE: 112 MMHG | HEIGHT: 63 IN | DIASTOLIC BLOOD PRESSURE: 76 MMHG | BODY MASS INDEX: 34.38 KG/M2

## 2024-05-21 DIAGNOSIS — L70.8 OTHER ACNE: Primary | ICD-10-CM

## 2024-05-21 DIAGNOSIS — Z01.419 WOMEN'S ANNUAL ROUTINE GYNECOLOGICAL EXAMINATION: ICD-10-CM

## 2024-05-21 DIAGNOSIS — Z30.41 ENCOUNTER FOR SURVEILLANCE OF CONTRACEPTIVE PILLS: ICD-10-CM

## 2024-05-21 PROCEDURE — 99395 PREV VISIT EST AGE 18-39: CPT | Performed by: NURSE PRACTITIONER

## 2024-05-21 PROCEDURE — 1036F TOBACCO NON-USER: CPT | Performed by: NURSE PRACTITIONER

## 2024-05-21 RX ORDER — SPIRONOLACTONE 50 MG/1
50 TABLET, FILM COATED ORAL 2 TIMES DAILY
Qty: 180 TABLET | Refills: 3 | Status: SHIPPED | OUTPATIENT
Start: 2024-05-21 | End: 2025-05-21

## 2024-05-21 RX ORDER — NORGESTIMATE AND ETHINYL ESTRADIOL 0.25-0.035
1 KIT ORAL DAILY
Qty: 84 TABLET | Refills: 3 | Status: SHIPPED | OUTPATIENT
Start: 2024-05-21

## 2024-05-21 ASSESSMENT — ENCOUNTER SYMPTOMS
RESPIRATORY NEGATIVE: 1
CONSTITUTIONAL NEGATIVE: 1
MUSCULOSKELETAL NEGATIVE: 1
EYES NEGATIVE: 1
CARDIOVASCULAR NEGATIVE: 1
GASTROINTESTINAL NEGATIVE: 1
NEUROLOGICAL NEGATIVE: 1
PSYCHIATRIC NEGATIVE: 1
ENDOCRINE NEGATIVE: 1

## 2024-05-21 NOTE — PROGRESS NOTES
Subjective   Patient ID: Charu Head is a 23 y.o. female who presents for Annual Exam (Normal PAP 1/4/2022).  23 year old here for annual exam without complaints.  She is doing well with her ocp and spironolactone and desires to continue. She had a normal potassium level ini Feb 2024.  She denies any abnormal bleeding, pain, discharge, urinary changes and breast complaints.  She denies any need for std testing.  She had a normal pap in 2022 and is due again in 2025.      Review of Systems   Constitutional: Negative.    HENT: Negative.     Eyes: Negative.    Respiratory: Negative.     Cardiovascular: Negative.    Gastrointestinal: Negative.    Endocrine: Negative.    Genitourinary: Negative.    Musculoskeletal: Negative.    Skin: Negative.    Neurological: Negative.    Psychiatric/Behavioral: Negative.         Objective   Physical Exam  Vitals reviewed.   Constitutional:       Appearance: Normal appearance. She is well-developed.   Pulmonary:      Effort: Pulmonary effort is normal. No respiratory distress.   Chest:   Breasts:     Breasts are symmetrical.      Right: Normal. No swelling, bleeding, inverted nipple, mass, nipple discharge, skin change or tenderness.      Left: Normal. No swelling, bleeding, inverted nipple, mass, nipple discharge, skin change or tenderness.   Abdominal:      Palpations: Abdomen is soft.   Genitourinary:     General: Normal vulva.      Exam position: Lithotomy position.      Pubic Area: No rash.       Labia:         Right: No rash, tenderness, lesion or injury.         Left: No rash, tenderness, lesion or injury.       Urethra: No prolapse, urethral pain, urethral swelling or urethral lesion.      Vagina: Normal.      Cervix: Normal.      Uterus: Normal.       Adnexa: Right adnexa normal and left adnexa normal.      Rectum: Normal.   Musculoskeletal:         General: Normal range of motion.   Lymphadenopathy:      Upper Body:      Right upper body: No supraclavicular, axillary or  pectoral adenopathy.      Left upper body: No supraclavicular, axillary or pectoral adenopathy.   Skin:     General: Skin is warm and dry.   Neurological:      General: No focal deficit present.      Mental Status: She is alert and oriented to person, place, and time. Mental status is at baseline.   Psychiatric:         Attention and Perception: Attention and perception normal.         Mood and Affect: Mood and affect normal.         Speech: Speech normal.         Behavior: Behavior normal. Behavior is cooperative.         Thought Content: Thought content normal.         Judgment: Judgment normal.   Exam performed by America NOLASCO, 2nd exam performed by student Miguel Miller student from [facility] upon patient agreement.      Assessment/Plan   Problem List Items Addressed This Visit             ICD-10-CM    Acne - Primary L70.9    Relevant Medications    spironolactone (Aldactone) 50 mg tablet    Women's annual routine gynecological examination Z01.419     Other Visit Diagnoses         Codes    Encounter for surveillance of contraceptive pills     Z30.41    Relevant Medications    norgestimate-ethinyl estradioL (Sprintec, 28,) 0.25-35 mg-mcg tablet        Pap due 2025  Oral contraceptives were discussed.  The risks and benefits were presented to the patient including the risk for VTE's and an increased risk with smoking. Patient stated understanding and desires use of OCP.  Additional use of condoms encouraged to patient to prevent STI's.  Sprintec ordered with refills x 1 year  Spironolactone ordered with refills x 1 year  Follow up 1 year for annual exam, sooner as needed if problems arise       Deb Palomares, RACHEL-CNP 05/21/24 9:41 AM

## 2024-08-09 ENCOUNTER — APPOINTMENT (OUTPATIENT)
Dept: PRIMARY CARE | Facility: CLINIC | Age: 24
End: 2024-08-09
Payer: COMMERCIAL

## 2024-08-16 ENCOUNTER — LAB (OUTPATIENT)
Dept: LAB | Facility: LAB | Age: 24
End: 2024-08-16
Payer: COMMERCIAL

## 2024-08-16 ENCOUNTER — APPOINTMENT (OUTPATIENT)
Dept: PRIMARY CARE | Facility: CLINIC | Age: 24
End: 2024-08-16
Payer: COMMERCIAL

## 2024-08-16 VITALS
HEIGHT: 63 IN | BODY MASS INDEX: 35.08 KG/M2 | WEIGHT: 198 LBS | HEART RATE: 98 BPM | DIASTOLIC BLOOD PRESSURE: 70 MMHG | SYSTOLIC BLOOD PRESSURE: 120 MMHG

## 2024-08-16 DIAGNOSIS — L70.0 ACNE VULGARIS: ICD-10-CM

## 2024-08-16 DIAGNOSIS — F41.9 ANXIETY: ICD-10-CM

## 2024-08-16 DIAGNOSIS — E55.9 VITAMIN D DEFICIENCY: ICD-10-CM

## 2024-08-16 DIAGNOSIS — L70.0 ACNE VULGARIS: Primary | ICD-10-CM

## 2024-08-16 DIAGNOSIS — E53.8 VITAMIN B12 DEFICIENCY: ICD-10-CM

## 2024-08-16 DIAGNOSIS — E78.2 MIXED HYPERLIPIDEMIA: ICD-10-CM

## 2024-08-16 PROCEDURE — 1036F TOBACCO NON-USER: CPT | Performed by: CLINICAL NURSE SPECIALIST

## 2024-08-16 PROCEDURE — 3008F BODY MASS INDEX DOCD: CPT | Performed by: CLINICAL NURSE SPECIALIST

## 2024-08-16 PROCEDURE — 99213 OFFICE O/P EST LOW 20 MIN: CPT | Performed by: CLINICAL NURSE SPECIALIST

## 2024-08-16 RX ORDER — ESCITALOPRAM OXALATE 20 MG/1
20 TABLET ORAL DAILY
Qty: 90 TABLET | Refills: 3 | Status: SHIPPED | OUTPATIENT
Start: 2024-08-16 | End: 2025-08-11

## 2024-08-16 ASSESSMENT — ENCOUNTER SYMPTOMS
BACK PAIN: 0
JOINT SWELLING: 0
MYALGIAS: 0
FEVER: 0
CHILLS: 0
PHOTOPHOBIA: 0
DYSURIA: 0
BLOOD IN STOOL: 0
CONSTIPATION: 0
DIARRHEA: 0
TROUBLE SWALLOWING: 0
CONFUSION: 0
SEIZURES: 0
NECK PAIN: 0
FLANK PAIN: 0
FATIGUE: 0
ACTIVITY CHANGE: 0
SORE THROAT: 0
NAUSEA: 0
ABDOMINAL PAIN: 0
HEADACHES: 0
BRUISES/BLEEDS EASILY: 0
PALPITATIONS: 0
WHEEZING: 0
POLYDIPSIA: 0
ARTHRALGIAS: 0
HEMATURIA: 0
LOSS OF SENSATION IN FEET: 0
UNEXPECTED WEIGHT CHANGE: 0
EYE PAIN: 0
DEPRESSION: 0
VOMITING: 0
CHEST TIGHTNESS: 0
APPETITE CHANGE: 0
WOUND: 0
OCCASIONAL FEELINGS OF UNSTEADINESS: 0
SHORTNESS OF BREATH: 0
DIZZINESS: 0
SLEEP DISTURBANCE: 0
COUGH: 0

## 2024-08-16 NOTE — PROGRESS NOTES
"Subjective   Patient ID: Charu Head is a 24 y.o. female who presents for Follow-up (Follow up).  HPI    Here today as a follow up appointment for her Anxiety.  Had been having increased concerns for Anxiety. Patient states that she started taking Ashwaghanda. In April noticed that her Anxiety was worse daily. Took medication for about 2-3 weeks. Thought she was feeling better and stopped medication. Tearful. Increased stressors. Graduated. Unsure what is causing her Anxiety to feel worse. Not interested in Counseling. Feels that she has a support system with her Fiance and Mother.      Tried to increase her Ashwaghanda. Still feels like the medication is wearing off for her. Interested in prescription medication.      Starting taking Lexapro. Has been doing well with medication. Increased Fatigue initially, improving. Was taking multiple naps a day, now no longer. Patient had noticed increased fidgeting. Had before medication, worsened after starting the medication. Denies any other side effects. Feels like \"a whole new person.\" Would like to continue current medication.      Has been able to go Shopping and to the Movies with friends now. Planning to go to a [employer] this weekend with her Fiance and friends. 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     Review of Systems   Constitutional:  Negative for activity change, appetite change, chills, fatigue, fever and unexpected weight change.   HENT:  Negative for ear pain, hearing loss, nosebleeds, sore throat, tinnitus and trouble swallowing.    Eyes:  Negative for photophobia, pain and visual disturbance.   Respiratory:  Negative for cough, chest tightness, shortness of breath and wheezing.    Cardiovascular:  Negative for chest pain, palpitations and leg swelling.   Gastrointestinal:  Negative for abdominal pain, blood in stool, constipation, diarrhea, nausea and vomiting.   Endocrine: Negative for cold intolerance, heat intolerance, polydipsia and polyuria. "   Genitourinary:  Negative for dysuria, flank pain and hematuria.   Musculoskeletal:  Negative for arthralgias, back pain, joint swelling, myalgias and neck pain.   Skin:  Negative for pallor, rash and wound.   Allergic/Immunologic: Negative for immunocompromised state.   Neurological:  Negative for dizziness, seizures and headaches.   Hematological:  Does not bruise/bleed easily.   Psychiatric/Behavioral:  Negative for confusion and sleep disturbance.        Objective   Physical Exam  Vitals and nursing note reviewed.   Constitutional:       General: She is not in acute distress.     Appearance: Normal appearance.   HENT:      Head: Normocephalic.      Nose: Nose normal.   Eyes:      Conjunctiva/sclera: Conjunctivae normal.   Neck:      Vascular: No carotid bruit.   Cardiovascular:      Rate and Rhythm: Normal rate and regular rhythm.      Pulses: Normal pulses.      Heart sounds: Normal heart sounds.   Pulmonary:      Effort: Pulmonary effort is normal.      Breath sounds: Normal breath sounds.   Abdominal:      General: Bowel sounds are normal.      Palpations: Abdomen is soft.   Musculoskeletal:         General: Normal range of motion.      Cervical back: Normal range of motion.   Skin:     General: Skin is warm and dry.   Neurological:      Mental Status: She is alert and oriented to person, place, and time. Mental status is at baseline.   Psychiatric:         Mood and Affect: Mood normal.         Behavior: Behavior normal.       Assessment/Plan       Due for updated lab work, ordered at [facility] today.       Anxiety: Continue current dosage of Lexapro.  Declines needing Counseling at this time.   Acne: Continue Spironolactone.   Vitamin B12 Deficiency: Vitamin B12. Reevaluate with next lab work.   Obesity: Hyperlipidemia: BMI: 35.07.  Lifestyle changes recommended: Diet consisting of low fat foods, lean meats, high fiber, fresh fruits and vegetables. 150 min/ weekly aerobic exercise. Started Strength Training in  January.     Kiara Mendenhall, APRN-CNS 08/16/24 9:18 AM

## 2024-08-30 ENCOUNTER — LAB (OUTPATIENT)
Dept: LAB | Facility: LAB | Age: 24
End: 2024-08-30
Payer: COMMERCIAL

## 2024-08-30 DIAGNOSIS — E78.2 MIXED HYPERLIPIDEMIA: ICD-10-CM

## 2024-08-30 DIAGNOSIS — E55.9 VITAMIN D DEFICIENCY: ICD-10-CM

## 2024-08-30 DIAGNOSIS — L70.0 ACNE VULGARIS: ICD-10-CM

## 2024-08-30 DIAGNOSIS — F41.9 ANXIETY: ICD-10-CM

## 2024-08-30 DIAGNOSIS — E53.8 VITAMIN B12 DEFICIENCY: ICD-10-CM

## 2024-08-30 LAB
25(OH)D3 SERPL-MCNC: 47 NG/ML (ref 30–100)
ALBUMIN SERPL BCP-MCNC: 4.3 G/DL (ref 3.4–5)
ALP SERPL-CCNC: 78 U/L (ref 33–110)
ALT SERPL W P-5'-P-CCNC: 12 U/L (ref 7–45)
ANION GAP SERPL CALC-SCNC: 14 MMOL/L (ref 10–20)
AST SERPL W P-5'-P-CCNC: 13 U/L (ref 9–39)
BILIRUB SERPL-MCNC: 0.6 MG/DL (ref 0–1.2)
BUN SERPL-MCNC: 9 MG/DL (ref 6–23)
CALCIUM SERPL-MCNC: 8.8 MG/DL (ref 8.6–10.3)
CHLORIDE SERPL-SCNC: 103 MMOL/L (ref 98–107)
CO2 SERPL-SCNC: 21 MMOL/L (ref 21–32)
CREAT SERPL-MCNC: 0.72 MG/DL (ref 0.5–1.05)
EGFRCR SERPLBLD CKD-EPI 2021: >90 ML/MIN/1.73M*2
ERYTHROCYTE [DISTWIDTH] IN BLOOD BY AUTOMATED COUNT: 11.8 % (ref 11.5–14.5)
GLUCOSE SERPL-MCNC: 83 MG/DL (ref 74–99)
HCT VFR BLD AUTO: 42.2 % (ref 36–46)
HGB BLD-MCNC: 14.5 G/DL (ref 12–16)
MCH RBC QN AUTO: 29.7 PG (ref 26–34)
MCHC RBC AUTO-ENTMCNC: 34.4 G/DL (ref 32–36)
MCV RBC AUTO: 86 FL (ref 80–100)
NRBC BLD-RTO: 0 /100 WBCS (ref 0–0)
PLATELET # BLD AUTO: 386 X10*3/UL (ref 150–450)
POTASSIUM SERPL-SCNC: 3.9 MMOL/L (ref 3.5–5.3)
PROT SERPL-MCNC: 7.3 G/DL (ref 6.4–8.2)
RBC # BLD AUTO: 4.89 X10*6/UL (ref 4–5.2)
SODIUM SERPL-SCNC: 134 MMOL/L (ref 136–145)
TSH SERPL-ACNC: 2.38 MIU/L (ref 0.44–3.98)
WBC # BLD AUTO: 12.5 X10*3/UL (ref 4.4–11.3)

## 2024-08-30 PROCEDURE — 36415 COLL VENOUS BLD VENIPUNCTURE: CPT

## 2024-08-30 PROCEDURE — 80053 COMPREHEN METABOLIC PANEL: CPT

## 2024-08-30 PROCEDURE — 82306 VITAMIN D 25 HYDROXY: CPT

## 2024-08-30 PROCEDURE — 85027 COMPLETE CBC AUTOMATED: CPT

## 2024-08-30 PROCEDURE — 84443 ASSAY THYROID STIM HORMONE: CPT

## 2024-08-31 DIAGNOSIS — D72.829 LEUKOCYTOSIS, UNSPECIFIED TYPE: Primary | ICD-10-CM

## 2025-03-11 ENCOUNTER — APPOINTMENT (OUTPATIENT)
Dept: PRIMARY CARE | Facility: CLINIC | Age: 25
End: 2025-03-11
Payer: COMMERCIAL

## 2025-05-16 ENCOUNTER — TELEPHONE (OUTPATIENT)
Dept: OBSTETRICS AND GYNECOLOGY | Facility: CLINIC | Age: 25
End: 2025-05-16
Payer: COMMERCIAL

## 2025-05-16 DIAGNOSIS — Z30.41 ENCOUNTER FOR SURVEILLANCE OF CONTRACEPTIVE PILLS: ICD-10-CM

## 2025-05-16 RX ORDER — NORGESTIMATE AND ETHINYL ESTRADIOL 0.25-0.035
1 KIT ORAL DAILY
Qty: 30 TABLET | Refills: 0 | Status: SHIPPED | OUTPATIENT
Start: 2025-05-16

## 2025-05-16 NOTE — TELEPHONE ENCOUNTER
Patient needs a refill of her medication SPRINTEC to get her until her appointment. She only has 2 pills left. Was not aware that she was out of refills.     Pharmacy- [facility] in [place]

## 2025-06-02 ENCOUNTER — APPOINTMENT (OUTPATIENT)
Dept: OBSTETRICS AND GYNECOLOGY | Facility: CLINIC | Age: 25
End: 2025-06-02
Payer: COMMERCIAL

## 2025-06-02 VITALS — WEIGHT: 199 LBS | HEIGHT: 63 IN | BODY MASS INDEX: 35.26 KG/M2

## 2025-06-02 DIAGNOSIS — Z01.419 WOMEN'S ANNUAL ROUTINE GYNECOLOGICAL EXAMINATION: Primary | ICD-10-CM

## 2025-06-02 DIAGNOSIS — Z30.41 ENCOUNTER FOR SURVEILLANCE OF CONTRACEPTIVE PILLS: ICD-10-CM

## 2025-06-02 DIAGNOSIS — Z11.51 SCREENING FOR HUMAN PAPILLOMAVIRUS (HPV): ICD-10-CM

## 2025-06-02 DIAGNOSIS — Z12.4 PAP SMEAR FOR CERVICAL CANCER SCREENING: ICD-10-CM

## 2025-06-02 PROCEDURE — 1036F TOBACCO NON-USER: CPT | Performed by: NURSE PRACTITIONER

## 2025-06-02 PROCEDURE — 99395 PREV VISIT EST AGE 18-39: CPT | Performed by: NURSE PRACTITIONER

## 2025-06-02 PROCEDURE — 3008F BODY MASS INDEX DOCD: CPT | Performed by: NURSE PRACTITIONER

## 2025-06-02 RX ORDER — NORGESTIMATE AND ETHINYL ESTRADIOL 0.25-0.035
1 KIT ORAL DAILY
Qty: 84 TABLET | Refills: 3 | Status: SHIPPED | OUTPATIENT
Start: 2025-06-02 | End: 2026-06-02

## 2025-06-02 ASSESSMENT — ENCOUNTER SYMPTOMS
PSYCHIATRIC NEGATIVE: 1
GASTROINTESTINAL NEGATIVE: 1
CONSTITUTIONAL NEGATIVE: 1
CARDIOVASCULAR NEGATIVE: 1
RESPIRATORY NEGATIVE: 1

## 2025-06-02 NOTE — PROGRESS NOTES
Subjective   Patient ID: Charu Head is a 25 y.o. female who presents for Annual Exam (Normal 1/4/2022/Last Annual 5/21/2024).  25 year old here for annual exam without complaints.  She is doing well with her ocp and desires to continue.  She is due for her pap today as her last pap was normal in 2022.  She denies any need for std testing. She denies any abnormal bleeding, pain, urinary changes and breast complaints.         Review of Systems   Constitutional: Negative.    Respiratory: Negative.     Cardiovascular: Negative.    Gastrointestinal: Negative.    Genitourinary: Negative.    Skin: Negative.    Psychiatric/Behavioral: Negative.         Objective   Physical Exam  Vitals reviewed.   Constitutional:       Appearance: Normal appearance. She is well-developed.   Pulmonary:      Effort: Pulmonary effort is normal. No respiratory distress.   Chest:   Breasts:     Breasts are symmetrical.      Right: Normal. No swelling, bleeding, inverted nipple, mass, nipple discharge, skin change or tenderness.      Left: Normal. No swelling, bleeding, inverted nipple, mass, nipple discharge, skin change or tenderness.   Abdominal:      Palpations: Abdomen is soft.   Genitourinary:     General: Normal vulva.      Exam position: Lithotomy position.      Pubic Area: No rash.       Labia:         Right: No rash, tenderness, lesion or injury.         Left: No rash, tenderness, lesion or injury.       Urethra: No prolapse, urethral pain, urethral swelling or urethral lesion.      Vagina: Normal.      Cervix: Normal.      Uterus: Normal.       Adnexa: Right adnexa normal and left adnexa normal.      Rectum: Normal.      Comments: Pap/hpv sent  Musculoskeletal:         General: Normal range of motion.   Lymphadenopathy:      Upper Body:      Right upper body: No supraclavicular, axillary or pectoral adenopathy.      Left upper body: No supraclavicular, axillary or pectoral adenopathy.   Skin:     General: Skin is warm and dry.    Neurological:      General: No focal deficit present.      Mental Status: She is alert and oriented to person, place, and time. Mental status is at baseline.   Psychiatric:         Attention and Perception: Attention and perception normal.         Mood and Affect: Mood and affect normal.         Speech: Speech normal.         Behavior: Behavior normal. Behavior is cooperative.         Thought Content: Thought content normal.         Judgment: Judgment normal.         Assessment/Plan   Problem List Items Addressed This Visit           ICD-10-CM    Women's annual routine gynecological examination - Primary Z01.419     Other Visit Diagnoses         Codes      Encounter for surveillance of contraceptive pills     Z30.41    Relevant Medications    norgestimate-ethinyl estradiol (Sprintec, 28,) 0.25-0.035 mg tablet        Oral contraceptives were discussed.  The risks and benefits were presented to the patient including the risk for VTE's and an increased risk with smoking. Patient stated understanding and desires use of OCP.  Additional use of condoms encouraged to patient to prevent STI's.  Sprintec ordered with refills x 1 year  Follow up 1 year for annual exam, sooner as needed if problems arise         RACHEL Caputo-CNP 06/02/25 10:01 AM

## 2025-06-27 LAB
CYTOLOGY CMNT CVX/VAG CYTO-IMP: NORMAL
LAB AP HPV GENOTYPE QUESTION: YES
LAB AP HPV HR: NORMAL
LABORATORY COMMENT REPORT: NORMAL
LMP START DATE: NORMAL
PATH REPORT.TOTAL CANCER: NORMAL

## 2025-07-24 ENCOUNTER — PATIENT MESSAGE (OUTPATIENT)
Dept: PRIMARY CARE | Facility: CLINIC | Age: 25
End: 2025-07-24
Payer: COMMERCIAL

## 2025-07-24 DIAGNOSIS — F41.9 ANXIETY: Primary | ICD-10-CM

## 2025-07-24 RX ORDER — BUSPIRONE HYDROCHLORIDE 5 MG/1
5 TABLET ORAL 2 TIMES DAILY PRN
Qty: 60 TABLET | Refills: 11 | Status: SHIPPED | OUTPATIENT
Start: 2025-07-24 | End: 2026-07-24

## 2025-08-21 LAB
BASOPHILS # BLD AUTO: 38 CELLS/UL (ref 0–200)
BASOPHILS NFR BLD AUTO: 0.4 %
EOSINOPHIL # BLD AUTO: 190 CELLS/UL (ref 15–500)
EOSINOPHIL NFR BLD AUTO: 2 %
ERYTHROCYTE [DISTWIDTH] IN BLOOD BY AUTOMATED COUNT: 12.1 % (ref 11–15)
HCT VFR BLD AUTO: 42.8 % (ref 35–45)
HGB BLD-MCNC: 14 G/DL (ref 11.7–15.5)
LYMPHOCYTES # BLD AUTO: 2698 CELLS/UL (ref 850–3900)
LYMPHOCYTES NFR BLD AUTO: 28.4 %
MCH RBC QN AUTO: 29.2 PG (ref 27–33)
MCHC RBC AUTO-ENTMCNC: 32.7 G/DL (ref 32–36)
MCV RBC AUTO: 89.4 FL (ref 80–100)
MONOCYTES # BLD AUTO: 475 CELLS/UL (ref 200–950)
MONOCYTES NFR BLD AUTO: 5 %
NEUTROPHILS # BLD AUTO: 6099 CELLS/UL (ref 1500–7800)
NEUTROPHILS NFR BLD AUTO: 64.2 %
PLATELET # BLD AUTO: 382 THOUSAND/UL (ref 140–400)
PMV BLD REES-ECKER: 8.9 FL (ref 7.5–12.5)
RBC # BLD AUTO: 4.79 MILLION/UL (ref 3.8–5.1)
WBC # BLD AUTO: 9.5 THOUSAND/UL (ref 3.8–10.8)

## 2025-08-25 ENCOUNTER — APPOINTMENT (OUTPATIENT)
Dept: PRIMARY CARE | Facility: CLINIC | Age: 25
End: 2025-08-25
Payer: COMMERCIAL

## 2025-08-25 VITALS
SYSTOLIC BLOOD PRESSURE: 122 MMHG | HEIGHT: 63 IN | BODY MASS INDEX: 34.2 KG/M2 | HEART RATE: 88 BPM | WEIGHT: 193 LBS | DIASTOLIC BLOOD PRESSURE: 68 MMHG

## 2025-08-25 DIAGNOSIS — E78.2 MIXED HYPERLIPIDEMIA: ICD-10-CM

## 2025-08-25 DIAGNOSIS — E55.9 VITAMIN D DEFICIENCY: ICD-10-CM

## 2025-08-25 DIAGNOSIS — F41.9 ANXIETY: ICD-10-CM

## 2025-08-25 DIAGNOSIS — E53.8 VITAMIN B12 DEFICIENCY: ICD-10-CM

## 2025-08-25 DIAGNOSIS — Z00.00 HEALTHCARE MAINTENANCE: Primary | ICD-10-CM

## 2025-08-25 PROCEDURE — 1036F TOBACCO NON-USER: CPT | Performed by: CLINICAL NURSE SPECIALIST

## 2025-08-25 PROCEDURE — 99395 PREV VISIT EST AGE 18-39: CPT | Performed by: CLINICAL NURSE SPECIALIST

## 2025-08-25 PROCEDURE — 3008F BODY MASS INDEX DOCD: CPT | Performed by: CLINICAL NURSE SPECIALIST

## 2025-08-25 RX ORDER — ESCITALOPRAM OXALATE 20 MG/1
20 TABLET ORAL DAILY
Qty: 90 TABLET | Refills: 3 | Status: SHIPPED | OUTPATIENT
Start: 2025-08-25 | End: 2026-08-20

## 2025-08-25 ASSESSMENT — ENCOUNTER SYMPTOMS
SORE THROAT: 0
FATIGUE: 0
DIZZINESS: 0
VOMITING: 0
DYSURIA: 0
SLEEP DISTURBANCE: 0
BLOOD IN STOOL: 0
HEMATURIA: 0
JOINT SWELLING: 0
ACTIVITY CHANGE: 0
UNEXPECTED WEIGHT CHANGE: 0
BRUISES/BLEEDS EASILY: 0
NECK PAIN: 0
POLYDIPSIA: 0
CHILLS: 0
WOUND: 0
ARTHRALGIAS: 0
WHEEZING: 0
APPETITE CHANGE: 0
COUGH: 0
SHORTNESS OF BREATH: 0
NAUSEA: 0
BACK PAIN: 0
PALPITATIONS: 0
FLANK PAIN: 0
EYE PAIN: 0
SEIZURES: 0
CONSTIPATION: 0
HEADACHES: 0
PHOTOPHOBIA: 0
MYALGIAS: 0
ABDOMINAL PAIN: 0
DIARRHEA: 0
CHEST TIGHTNESS: 0
CONFUSION: 0
TROUBLE SWALLOWING: 0
FEVER: 0

## 2025-08-25 ASSESSMENT — ANXIETY QUESTIONNAIRES
1. FEELING NERVOUS, ANXIOUS, OR ON EDGE: MORE THAN HALF THE DAYS
IF YOU CHECKED OFF ANY PROBLEMS ON THIS QUESTIONNAIRE, HOW DIFFICULT HAVE THESE PROBLEMS MADE IT FOR YOU TO DO YOUR WORK, TAKE CARE OF THINGS AT HOME, OR GET ALONG WITH OTHER PEOPLE: NOT DIFFICULT AT ALL
5. BEING SO RESTLESS THAT IT IS HARD TO SIT STILL: NEARLY EVERY DAY
GAD7 TOTAL SCORE: 6
2. NOT BEING ABLE TO STOP OR CONTROL WORRYING: NOT AT ALL
4. TROUBLE RELAXING: NOT AT ALL
7. FEELING AFRAID AS IF SOMETHING AWFUL MIGHT HAPPEN: NOT AT ALL
3. WORRYING TOO MUCH ABOUT DIFFERENT THINGS: NOT AT ALL
6. BECOMING EASILY ANNOYED OR IRRITABLE: SEVERAL DAYS

## 2025-09-02 ENCOUNTER — APPOINTMENT (OUTPATIENT)
Dept: PRIMARY CARE | Facility: CLINIC | Age: 25
End: 2025-09-02
Payer: COMMERCIAL

## 2025-09-03 LAB
25(OH)D3+25(OH)D2 SERPL-MCNC: 38 NG/ML (ref 30–100)
ALBUMIN SERPL-MCNC: 4.2 G/DL (ref 3.6–5.1)
ALP SERPL-CCNC: 68 U/L (ref 31–125)
ALT SERPL-CCNC: 14 U/L (ref 6–29)
ANION GAP SERPL CALCULATED.4IONS-SCNC: 9 MMOL/L (CALC) (ref 7–17)
AST SERPL-CCNC: 13 U/L (ref 10–30)
BILIRUB SERPL-MCNC: 0.8 MG/DL (ref 0.2–1.2)
BUN SERPL-MCNC: 11 MG/DL (ref 7–25)
CALCIUM SERPL-MCNC: 8.9 MG/DL (ref 8.6–10.2)
CHLORIDE SERPL-SCNC: 103 MMOL/L (ref 98–110)
CHOLEST SERPL-MCNC: 195 MG/DL
CHOLEST/HDLC SERPL: 3.8 (CALC)
CO2 SERPL-SCNC: 26 MMOL/L (ref 20–32)
CREAT SERPL-MCNC: 0.63 MG/DL (ref 0.5–0.96)
EGFRCR SERPLBLD CKD-EPI 2021: 126 ML/MIN/1.73M2
EST. AVERAGE GLUCOSE BLD GHB EST-MCNC: 103 MG/DL
EST. AVERAGE GLUCOSE BLD GHB EST-SCNC: 5.7 MMOL/L
GLUCOSE SERPL-MCNC: 74 MG/DL (ref 65–99)
HBA1C MFR BLD: 5.2 %
HCV AB SERPL QL IA: NORMAL
HDLC SERPL-MCNC: 52 MG/DL
HIV 1+2 AB+HIV1 P24 AG SERPL QL IA: NORMAL
HIV 1+2 AB+HIV1 P24 AG SERPL QL IA: NORMAL
LDLC SERPL CALC-MCNC: 122 MG/DL (CALC)
NONHDLC SERPL-MCNC: 143 MG/DL (CALC)
POTASSIUM SERPL-SCNC: 4.1 MMOL/L (ref 3.5–5.3)
PROT SERPL-MCNC: 6.9 G/DL (ref 6.1–8.1)
SODIUM SERPL-SCNC: 138 MMOL/L (ref 135–146)
TRIGL SERPL-MCNC: 103 MG/DL
TSH SERPL-ACNC: 1.39 MIU/L
VIT B12 SERPL-MCNC: 361 PG/ML (ref 200–1100)

## 2026-06-08 ENCOUNTER — APPOINTMENT (OUTPATIENT)
Dept: OBSTETRICS AND GYNECOLOGY | Facility: CLINIC | Age: 26
End: 2026-06-08
Payer: COMMERCIAL

## 2026-08-25 ENCOUNTER — APPOINTMENT (OUTPATIENT)
Dept: PRIMARY CARE | Facility: CLINIC | Age: 26
End: 2026-08-25
Payer: COMMERCIAL